# Patient Record
Sex: MALE | Race: BLACK OR AFRICAN AMERICAN | NOT HISPANIC OR LATINO | Employment: UNEMPLOYED | URBAN - METROPOLITAN AREA
[De-identification: names, ages, dates, MRNs, and addresses within clinical notes are randomized per-mention and may not be internally consistent; named-entity substitution may affect disease eponyms.]

---

## 2022-02-28 ENCOUNTER — OUTSIDE PLACE OF SERVICE (OUTPATIENT)
Dept: CARDIOLOGY | Facility: CLINIC | Age: 72
End: 2022-02-28
Payer: MEDICARE

## 2022-02-28 PROCEDURE — 93010 ELECTROCARDIOGRAM REPORT: CPT | Mod: ,,, | Performed by: INTERNAL MEDICINE

## 2022-02-28 PROCEDURE — 93010 PR ELECTROCARDIOGRAM REPORT: ICD-10-PCS | Mod: ,,, | Performed by: INTERNAL MEDICINE

## 2022-03-01 ENCOUNTER — OUTSIDE PLACE OF SERVICE (OUTPATIENT)
Dept: CARDIOLOGY | Facility: CLINIC | Age: 72
End: 2022-03-01
Payer: MEDICARE

## 2022-03-01 ENCOUNTER — TELEMEDICINE (OUTPATIENT)
Dept: CARDIOLOGY | Facility: HOSPITAL | Age: 72
End: 2022-03-01
Payer: MEDICARE

## 2022-03-01 PROCEDURE — 93010 ELECTROCARDIOGRAM REPORT: CPT | Mod: ,,, | Performed by: INTERNAL MEDICINE

## 2022-03-01 PROCEDURE — 93010 PR ELECTROCARDIOGRAM REPORT: ICD-10-PCS | Mod: ,,, | Performed by: INTERNAL MEDICINE

## 2022-03-01 PROCEDURE — 99220 PR INITIAL OBSERVATION CARE,LEVL III: CPT | Mod: GT,,, | Performed by: INTERNAL MEDICINE

## 2022-03-01 PROCEDURE — 99220 PR INITIAL OBSERVATION CARE,LEVL III: ICD-10-PCS | Mod: GT,,, | Performed by: INTERNAL MEDICINE

## 2022-03-01 NOTE — TELEMEDICINE CONSULT
Tele-Consult from Cardiology  [facility]  Cardiology  Teleconsultation Note      This consultation is from Jesse Dimas and was requested by        IP Unit    The patient arrived at the ED at:       )    Spoke nurse at bedside with patient assisting consultant. Also present with the patient at the time of the consultation:Nurse     Consults  Subjective:     History of Present Illness:    Dictation #1  MRN:[id number]  CSN:[id number]  Tele medicine consult from [facility].  71 years old male was admitted for syncope.  Unclear duration of syncope. It happed while sitting. No reported seizure.  By the time EMS arrived he was already awake.  Admitted to [facility].  Troponin noted to be slightly elevated 81--67--76--63. .  EKG interpretation he had occasional PVCs.  Also it was reported that he did have 10 beats run of nonsustained ventricular tachycardia at rate of 150 beats per minute.  Patient was given a bolus of IV fluids yesterday that resulted in respiratory distress and was given Lasix with improvement.  Chest x-ray was reported as clear.  EKG SR, frequent PVCs, prolonged QT, poor R wave progression   Patient does not take any medications.  And he does not follow-up with a physician.  It was reported that orthostatics were checked and was normal.  CPK was elevated 1900.  Current blood pressure 140/85  Sodium 141, potassium 3.5, BUN 35, serum creatinine 1.54, magnesium 1.7, hemoglobin 10, platelets 180  Review of Systems   Constitutional: Negative for chills and fever.   HENT: Negative.    Respiratory:        As in HPI    Cardiovascular:        As in HPI    Gastrointestinal:        Abd pain , frequent Bms.    Skin: Negative.    Neurological:        As in HPI    Psychiatric/Behavioral: Negative.      Physical Exam  Constitutional:       Appearance: Normal appearance.   HENT:      Head: Normocephalic and atraumatic.   Cardiovascular:      Rate and Rhythm: Rhythm irregular.   Pulmonary:       Comments: Lungs clear per nursing staff   Abdominal:      General: Abdomen is flat.      Palpations: Abdomen is soft.   Musculoskeletal:      Right lower leg: No edema.      Left lower leg: No edema.   Neurological:      General: No focal deficit present.      Mental Status: He is alert.   Psychiatric:         Mood and Affect: Mood normal.         Behavior: Behavior normal.       No notes on file    Subjective & objective note cannot be loaded without a specified hospital service.    Assessment/Plan:     Impression: Patient is a 71 y.o. male with:    71 year old male with     1. Syncope  2. NSVT  3. PVCs   4. Rhabdo   5. ASUNCION     Plan:  - Check Echo to assess EF and WMA  - Will start BB for the NSVT. Start Toprol 25 mg daily  - Will need 30 days EM  - Please keep mg > 2 and k > 4          Assessment & plan notes cannot be loaded without a specified hospital service.                Consultation ended: Teleconsult Time Documentation    Consulting clinician was informed of the encounter and consult note.    Jesse Dimas MD  Cardiology  [facility]

## 2022-03-02 ENCOUNTER — CLINICAL SUPPORT (OUTPATIENT)
Dept: CARDIOLOGY | Facility: CLINIC | Age: 72
End: 2022-03-02
Attending: INTERNAL MEDICINE
Payer: MEDICARE

## 2022-03-02 ENCOUNTER — OUTSIDE PLACE OF SERVICE (OUTPATIENT)
Dept: CARDIOLOGY | Facility: CLINIC | Age: 72
End: 2022-03-02

## 2022-03-02 VITALS — WEIGHT: 150 LBS | BODY MASS INDEX: 19.88 KG/M2 | HEIGHT: 73 IN

## 2022-03-02 DIAGNOSIS — R55 SYNCOPE, UNSPECIFIED SYNCOPE TYPE: ICD-10-CM

## 2022-03-02 LAB
AV PEAK GRADIENT: 13 MMHG
AV VELOCITY RATIO: 0.5
BSA FOR ECHO PROCEDURE: 1.87 M2
CV ECHO LV RWT: 0.03 CM
DOP CALC AO PEAK VEL: 1.8 M/S
DOP CALC LVOT AREA: 3.8 CM2
DOP CALC LVOT DIAMETER: 2.2 CM
DOP CALC LVOT PEAK VEL: 0.9 M/S
ECHO LV POSTERIOR WALL: 0.07 CM (ref 0.6–1.1)
EJECTION FRACTION: 55 %
FRACTIONAL SHORTENING: 22 % (ref 28–44)
INTERVENTRICULAR SEPTUM: 1 CM (ref 0.6–1.1)
LA MAJOR: 5.4 CM
LA MINOR: 6.3 CM
LA WIDTH: 4.7 CM
LEFT ATRIUM SIZE: 3.8 CM
LEFT ATRIUM VOLUME INDEX: 46.5 ML/M2
LEFT ATRIUM VOLUME: 88.28 CM3
LEFT INTERNAL DIMENSION IN SYSTOLE: 4 CM (ref 2.1–4)
LEFT VENTRICLE MASS INDEX: 45 G/M2
LEFT VENTRICULAR INTERNAL DIMENSION IN DIASTOLE: 5.1 CM (ref 3.5–6)
LEFT VENTRICULAR MASS: 85.66 G
PISA TR MAX VEL: 3.55 M/S
PV PEAK VELOCITY: 1.21 CM/S
RA MAJOR: 5.2 CM
RA PRESSURE: 8 MMHG
TDI LATERAL: 0.07 M/S
TDI SEPTAL: 0.04 M/S
TDI: 0.06 M/S
TR MAX PG: 50 MMHG
TV REST PULMONARY ARTERY PRESSURE: 58 MMHG

## 2022-03-02 PROCEDURE — 93306 ECHO (CUPID ONLY): ICD-10-PCS | Mod: 26,,, | Performed by: INTERNAL MEDICINE

## 2022-03-02 PROCEDURE — 93306 TTE W/DOPPLER COMPLETE: CPT | Mod: 26,,, | Performed by: INTERNAL MEDICINE

## 2022-03-11 NOTE — PROGRESS NOTES
Subjective:   @Patient ID:  Jonh Smith is a 71 y.o. male who presents for evaluation of Syncope     HPI:   Here for follow up post hospital discharge   He is accompanied by his sister  He stated that he is doing well. No chest pain, no palpitations  No recurrent syncope  He uses cane for ambulation due to his knees.   No tobacco abuse      Admitted to to [facility] 3/2022 with syncope. Unclear duration, no reported seizures. When EMS arrived he was already awake.  Troponin noted to be slightly elevated 81--67--76--63. EKG had occasional PVCs.  Also it was reported that he did have 10 beats NSVT on the monitor rate was 150 bpm. Toprol was started. Echo as below. 30 days EM recommended. Per his sister's description. She was told symptoms lasted for 3-5 minutes, his eyes rolled back helton. No injuries happened. It happened while he was sitting       Prior cardiovascular  Hx  --------------------------------         - ECHO 3/2/2022  · The left ventricle is normal in size with normal systolic function.  · The estimated ejection fraction is 55%.  · Mild-to-moderate mitral regurgitation.  · Normal left ventricular diastolic function.  · Mild aortic regurgitation.  · Mild to moderate tricuspid regurgitation.  · Mild pulmonic regurgitation.  · The estimated PA systolic pressure is 58 mmHg.  · Normal right ventricular size with normal right ventricular systolic function.  · There is moderate pulmonary hypertension.        - EKG 2/28/2022 SR, PACs, PVCs, poor r wave progression           Patient Active Problem List    Diagnosis Date Noted    NSVT (nonsustained ventricular tachycardia) 03/14/2022    Syncope and collapse 03/14/2022    Pulmonary HTN 03/14/2022    Nonrheumatic mitral valve regurgitation 03/14/2022    Nonrheumatic tricuspid valve regurgitation 03/14/2022                    LAST HbA1c  No results found for: HGBA1C    Lipid panel  No results found for: CHOL  No results found for: HDL  No results found for:  LDLCALC  No results found for: TRIG  No results found for: CHOLHDL         Review of Systems   Constitutional: Negative for chills and fever.   HENT: Negative for hearing loss and nosebleeds.    Eyes: Negative for blurred vision.   Cardiovascular: Negative for chest pain, leg swelling and palpitations.   Respiratory: Negative for hemoptysis and shortness of breath.    Hematologic/Lymphatic: Negative for bleeding problem.   Skin: Negative for itching.   Musculoskeletal: Positive for arthritis.   Gastrointestinal: Negative for abdominal pain and hematochezia.   Genitourinary: Negative for hematuria.   Neurological:        As in HPI    Psychiatric/Behavioral: Negative for altered mental status and depression.       Objective:   Physical Exam  Constitutional:       Appearance: He is well-developed.   HENT:      Head: Normocephalic and atraumatic.   Eyes:      Conjunctiva/sclera: Conjunctivae normal.   Neck:      Vascular: No carotid bruit or JVD.   Cardiovascular:      Rate and Rhythm: Normal rate and regular rhythm.      Pulses: Decreased pulses.           Carotid pulses are 2+ on the right side and 2+ on the left side.       Radial pulses are 2+ on the right side and 2+ on the left side.      Heart sounds: Normal heart sounds. No murmur heard.    No friction rub. No gallop.   Pulmonary:      Effort: Pulmonary effort is normal. No respiratory distress.      Breath sounds: Normal breath sounds. No stridor. No wheezing.   Musculoskeletal:      Cervical back: Neck supple.   Skin:     General: Skin is warm and dry.   Neurological:      Mental Status: He is alert and oriented to person, place, and time.   Psychiatric:         Behavior: Behavior normal.         Assessment:     1. Syncope and collapse    2. NSVT (nonsustained ventricular tachycardia)    3. Other forms of angina pectoris    4. Pulmonary HTN    5. Nonrheumatic mitral valve regurgitation    6. Nonrheumatic tricuspid valve regurgitation        Plan:     Unclear  etiology for his syncope. ?? Arrhythmias related in the setting of NSVT noted while inpatient. Will arranged to 30 days EM   Increase Toprol to 25 mg daily   Stress MPI   Monitor for th MR, TR  PHTN noted. May consider RHC in the future      Pertinent cardiac images and EKG reviewed independently.    Continue with current medical plan and lifestyle changes.  Return sooner for concerns or questions. If symptoms persist go to the ED  I have reviewed all pertinent data including patient's medical history in detail and updated the computerized patient record.     Orders Placed This Encounter   Procedures    NM Myocardial Perfusion Spect Multi Pharmacologic     Standing Status:   Future     Standing Expiration Date:   3/14/2023     Order Specific Question:   May the Radiologist modify the order per protocol to meet the clinical needs of the patient?     Answer:   Yes     Order Specific Question:   Stress Medication to use:     Answer:   Regadenoson     Order Specific Question:   Diabetes?     Answer:   No     Order Specific Question:   Will a Cardiologist read this study?     Answer:   No    Cardiac event monitor     Standing Status:   Future     Standing Expiration Date:   3/14/2023     Order Specific Question:   Cardiac Event Monitor     Answer:   Auto Trigger     Order Specific Question:   Release to patient     Answer:   Immediate    Nuclear Stress Test     Standing Status:   Future     Standing Expiration Date:   3/14/2023     Order Specific Question:   Which stress agent will be used     Answer:   Pharm     Order Specific Question:   Which medicaton for the stress procedure?     Answer:   Regadenoson     Order Specific Question:   Release to patient     Answer:   Immediate       Follow up as scheduled.     He expressed verbal understanding and agreed with the plan    Patient's Medications   New Prescriptions    No medications on file   Previous Medications    FOLIC ACID (FOLVITE) 1 MG TABLET    Take 1,000 mcg by  mouth once daily.    MULTIVIT-MINERALS/FOLIC ACID (CENTRUM ADULT 50 FRESH-FRUITY ORAL)    Take by mouth.    TAMSULOSIN (FLOMAX) 0.4 MG CAP    Take 1 capsule by mouth once daily.   Modified Medications    Modified Medication Previous Medication    METOPROLOL SUCCINATE (TOPROL-XL) 25 MG 24 HR TABLET metoprolol succinate (TOPROL-XL) 25 MG 24 hr tablet       Take 1 tablet (25 mg total) by mouth once daily.    Take 25 mg by mouth once daily.   Discontinued Medications    CEFDINIR (OMNICEF) 300 MG CAPSULE    Take 300 mg by mouth every 12 (twelve) hours.    COLCHICINE 0.6 MG TABLET    Take 1 tablet (0.6 mg total) by mouth once daily. Take 2 tabs initially then 1 tab one hour later.  Then take once daily on day 2.    ETODOLAC (LODINE) 300 MG CAP    Take 300 mg by mouth 2 (two) times daily.    HYDROCODONE-ACETAMINOPHEN 5-325MG (NORCO) 5-325 MG PER TABLET    Take 1 tablet by mouth every 4 (four) hours as needed for Pain.    HYDROCODONE-ACETAMINOPHEN 7.5-325MG (NORCO) 7.5-325 MG PER TABLET    Take 1 tablet by mouth every 6 (six) hours as needed for Pain.    POTASSIUM CHLORIDE SA (K-DUR,KLOR-CON) 20 MEQ TABLET    Take 20 mEq by mouth once daily.

## 2022-03-14 ENCOUNTER — OFFICE VISIT (OUTPATIENT)
Dept: CARDIOLOGY | Facility: CLINIC | Age: 72
End: 2022-03-14
Payer: MEDICARE

## 2022-03-14 VITALS
DIASTOLIC BLOOD PRESSURE: 69 MMHG | WEIGHT: 200.88 LBS | HEIGHT: 73 IN | HEART RATE: 86 BPM | SYSTOLIC BLOOD PRESSURE: 130 MMHG | OXYGEN SATURATION: 98 % | BODY MASS INDEX: 26.62 KG/M2

## 2022-03-14 DIAGNOSIS — I34.0 NONRHEUMATIC MITRAL VALVE REGURGITATION: Chronic | ICD-10-CM

## 2022-03-14 DIAGNOSIS — I20.89 OTHER FORMS OF ANGINA PECTORIS: ICD-10-CM

## 2022-03-14 DIAGNOSIS — I36.1 NONRHEUMATIC TRICUSPID VALVE REGURGITATION: Chronic | ICD-10-CM

## 2022-03-14 DIAGNOSIS — I47.29 NSVT (NONSUSTAINED VENTRICULAR TACHYCARDIA): ICD-10-CM

## 2022-03-14 DIAGNOSIS — R55 SYNCOPE AND COLLAPSE: Primary | ICD-10-CM

## 2022-03-14 DIAGNOSIS — I27.20 PULMONARY HTN: Chronic | ICD-10-CM

## 2022-03-14 PROCEDURE — 99214 OFFICE O/P EST MOD 30 MIN: CPT | Mod: S$GLB,,, | Performed by: INTERNAL MEDICINE

## 2022-03-14 PROCEDURE — 99214 PR OFFICE/OUTPT VISIT, EST, LEVL IV, 30-39 MIN: ICD-10-PCS | Mod: S$GLB,,, | Performed by: INTERNAL MEDICINE

## 2022-03-14 RX ORDER — POTASSIUM CHLORIDE 20 MEQ/1
20 TABLET, EXTENDED RELEASE ORAL DAILY
COMMUNITY
Start: 2022-03-03 | End: 2022-03-14

## 2022-03-14 RX ORDER — TAMSULOSIN HYDROCHLORIDE 0.4 MG/1
1 CAPSULE ORAL DAILY
COMMUNITY
Start: 2022-03-03

## 2022-03-14 RX ORDER — METOPROLOL SUCCINATE 25 MG/1
25 TABLET, EXTENDED RELEASE ORAL DAILY
COMMUNITY
Start: 2022-03-03 | End: 2022-03-14 | Stop reason: SDUPTHER

## 2022-03-14 RX ORDER — CEFDINIR 300 MG/1
300 CAPSULE ORAL EVERY 12 HOURS
COMMUNITY
Start: 2022-03-03 | End: 2022-03-14

## 2022-03-14 RX ORDER — FOLIC ACID 1 MG/1
1000 TABLET ORAL DAILY
COMMUNITY
Start: 2022-03-03

## 2022-03-14 RX ORDER — METOPROLOL SUCCINATE 25 MG/1
25 TABLET, EXTENDED RELEASE ORAL DAILY
Qty: 30 TABLET | Refills: 11 | Status: SHIPPED | OUTPATIENT
Start: 2022-03-14 | End: 2022-05-11 | Stop reason: SDUPTHER

## 2022-03-15 ENCOUNTER — CLINICAL SUPPORT (OUTPATIENT)
Dept: CARDIOLOGY | Facility: HOSPITAL | Age: 72
End: 2022-03-15
Attending: INTERNAL MEDICINE
Payer: MEDICARE

## 2022-03-15 DIAGNOSIS — I47.29 NSVT (NONSUSTAINED VENTRICULAR TACHYCARDIA): ICD-10-CM

## 2022-03-15 DIAGNOSIS — R55 SYNCOPE AND COLLAPSE: ICD-10-CM

## 2022-03-25 ENCOUNTER — HOSPITAL ENCOUNTER (OUTPATIENT)
Dept: RADIOLOGY | Facility: HOSPITAL | Age: 72
Discharge: HOME | End: 2022-03-25
Attending: INTERNAL MEDICINE
Payer: MEDICARE

## 2022-03-25 ENCOUNTER — HOSPITAL ENCOUNTER (OUTPATIENT)
Dept: CARDIOLOGY | Facility: HOSPITAL | Age: 72
Discharge: HOME | End: 2022-03-25
Attending: INTERNAL MEDICINE
Payer: MEDICARE

## 2022-03-25 DIAGNOSIS — R55 SYNCOPE AND COLLAPSE: ICD-10-CM

## 2022-03-25 DIAGNOSIS — I20.89 OTHER FORMS OF ANGINA PECTORIS: ICD-10-CM

## 2022-03-25 DIAGNOSIS — I47.29 NSVT (NONSUSTAINED VENTRICULAR TACHYCARDIA): ICD-10-CM

## 2022-03-25 LAB
CV PHARM DOSE: 0 MG
CV STRESS BASE HR: 77 BPM
DIASTOLIC BLOOD PRESSURE: 115 MMHG
OHS CV CPX 1 MINUTE RECOVERY HEART RATE: 88 BPM
OHS CV CPX 85 PERCENT MAX PREDICTED HEART RATE MALE: 128
OHS CV CPX MAX PREDICTED HEART RATE: 150
OHS CV CPX PATIENT IS FEMALE: 0
OHS CV CPX PATIENT IS MALE: 1
OHS CV CPX PEAK DIASTOLIC BLOOD PRESSURE: 115 MMHG
OHS CV CPX PEAK HEAR RATE: 90 BPM
OHS CV CPX PEAK RATE PRESSURE PRODUCT: NORMAL
OHS CV CPX PEAK SYSTOLIC BLOOD PRESSURE: 164 MMHG
OHS CV CPX PERCENT MAX PREDICTED HEART RATE ACHIEVED: 60
OHS CV CPX RATE PRESSURE PRODUCT PRESENTING: NORMAL
SYSTOLIC BLOOD PRESSURE: 164 MMHG

## 2022-03-25 PROCEDURE — 93017 CV STRESS TEST TRACING ONLY: CPT | Mod: PO

## 2022-03-25 PROCEDURE — 93016 CV STRESS TEST SUPVJ ONLY: CPT | Mod: ,,, | Performed by: INTERNAL MEDICINE

## 2022-03-25 PROCEDURE — 63600175 PHARM REV CODE 636 W HCPCS: Mod: PO | Performed by: INTERNAL MEDICINE

## 2022-03-25 PROCEDURE — 93018 CV STRESS TEST I&R ONLY: CPT | Mod: ,,, | Performed by: INTERNAL MEDICINE

## 2022-03-25 PROCEDURE — 93018 NUCLEAR STRESS TEST (CUPID ONLY): ICD-10-PCS | Mod: ,,, | Performed by: INTERNAL MEDICINE

## 2022-03-25 PROCEDURE — 93016 NUCLEAR STRESS TEST (CUPID ONLY): ICD-10-PCS | Mod: ,,, | Performed by: INTERNAL MEDICINE

## 2022-03-25 PROCEDURE — A9502 TC99M TETROFOSMIN: HCPCS | Mod: PO

## 2022-03-25 RX ORDER — REGADENOSON 0.08 MG/ML
0.4 INJECTION, SOLUTION INTRAVENOUS ONCE
Status: COMPLETED | OUTPATIENT
Start: 2022-03-25 | End: 2022-03-25

## 2022-03-25 RX ADMIN — REGADENOSON 0.4 MG: 0.08 INJECTION, SOLUTION INTRAVENOUS at 01:03

## 2022-04-28 NOTE — PROGRESS NOTES
Please let him know the monitor result is ok. It didn't show any significant arrhythmias. Just occasional extra beats. Thanks

## 2022-05-09 NOTE — PROGRESS NOTES
Subjective:   @Patient ID:  Jonh Smith is a 72 y.o. male who presents for evaluation of Syncope     HPI:   Here for follow up   He is accompanied by his sister  He stated that he is doing well. No chest pain, no palpitations  No recurrent syncope  He uses cane for ambulation due to his knees.   Stress MPI with no ischemia   30 day EM with no arrhythmias     No tobacco abuse      Admitted to to [facility] 3/2022 with syncope. Unclear duration, no reported seizures. When EMS arrived he was already awake.  Troponin noted to be slightly elevated 81--67--76--63. EKG had occasional PVCs.  Also it was reported that he did have 10 beats NSVT on the monitor rate was 150 bpm. Toprol was started. Echo as below. 30 days EM recommended. Per his sister's description. She was told symptoms lasted for 3-5 minutes, his eyes rolled back helton. No injuries happened. It happened while he was sitting       Prior cardiovascular  Hx  --------------------------------    Stress MPI 3/14/2022  · Baseline rhythm was normal sinus rhythm with normal intervals, occasional PACs and PVCs, and an initial heart rate of 78 bpm.  · There was one patient-triggered episode with reported symptoms of dizziness. This episode corresponded to a period of normal sinus rhythm with occasional PACs and PVCs.  · There were no concerning atrial or ventricular arrhythmias.      30 days EM 4/2022  · Baseline rhythm was normal sinus rhythm with normal intervals, occasional PACs and PVCs, and an initial heart rate of 78 bpm.  · There was one patient-triggered episode with reported symptoms of dizziness. This episode corresponded to a period of normal sinus rhythm with occasional PACs and PVCs.  · There were no concerning atrial or ventricular arrhythmias.           - ECHO 3/2/2022  · The left ventricle is normal in size with normal systolic function.  · The estimated ejection fraction is 55%.  · Mild-to-moderate mitral regurgitation.  · Normal left ventricular  diastolic function.  · Mild aortic regurgitation.  · Mild to moderate tricuspid regurgitation.  · Mild pulmonic regurgitation.  · The estimated PA systolic pressure is 58 mmHg.  · Normal right ventricular size with normal right ventricular systolic function.  · There is moderate pulmonary hypertension.        - EKG 2/28/2022 SR, PACs, PVCs, poor r wave progression           Patient Active Problem List    Diagnosis Date Noted    NSVT (nonsustained ventricular tachycardia) 03/14/2022    Syncope and collapse 03/14/2022    Pulmonary HTN 03/14/2022    Nonrheumatic mitral valve regurgitation 03/14/2022    Nonrheumatic tricuspid valve regurgitation 03/14/2022                    LAST HbA1c  No results found for: HGBA1C    Lipid panel  No results found for: CHOL  No results found for: HDL  No results found for: LDLCALC  No results found for: TRIG  No results found for: CHOLHDL         Review of Systems   Constitutional: Negative for chills and fever.   HENT: Negative for hearing loss and nosebleeds.    Eyes: Negative for blurred vision.   Cardiovascular: Negative for chest pain, leg swelling and palpitations.   Respiratory: Negative for hemoptysis and shortness of breath.    Hematologic/Lymphatic: Negative for bleeding problem.   Skin: Negative for itching.   Musculoskeletal: Positive for arthritis.   Gastrointestinal: Negative for abdominal pain and hematochezia.   Genitourinary: Negative for hematuria.   Neurological:        As in HPI    Psychiatric/Behavioral: Negative for altered mental status and depression.       Objective:   Physical Exam  Constitutional:       Appearance: He is well-developed.   HENT:      Head: Normocephalic and atraumatic.   Eyes:      Conjunctiva/sclera: Conjunctivae normal.   Neck:      Vascular: No carotid bruit or JVD.   Cardiovascular:      Rate and Rhythm: Normal rate and regular rhythm.      Pulses: Decreased pulses.           Carotid pulses are 2+ on the right side and 2+ on the left  side.       Radial pulses are 2+ on the right side and 2+ on the left side.      Heart sounds: Normal heart sounds. No murmur heard.    No friction rub. No gallop.   Pulmonary:      Effort: Pulmonary effort is normal. No respiratory distress.      Breath sounds: Normal breath sounds. No stridor. No wheezing.   Musculoskeletal:      Cervical back: Neck supple.   Skin:     General: Skin is warm and dry.   Neurological:      Mental Status: He is alert and oriented to person, place, and time.   Psychiatric:         Behavior: Behavior normal.         Assessment:     1. Pulmonary HTN    2. NSVT (nonsustained ventricular tachycardia)    3. Nonrheumatic mitral valve regurgitation    4. Nonrheumatic tricuspid valve regurgitation        Plan:   - 30 day Em with no arrhythmias. If recurrent syncope then will recommend ILR   - Repeat BP is 120s/60s b/l  - Continue Toprol to 25 mg daily   - Monitor for th MR, TR. Repeat echo after 1-2 years     PHTN noted. May consider RHC in the future     I spent 5-10 minutes asking, assessing, assisting, arranging and advising heart healthy diet improvements. This included low-salt meals, portion control and health food alternatives. I also encourage 30 minutes of moderate exercise 3-4x a week.     Pertinent cardiac images and EKG reviewed independently.    Continue with current medical plan and lifestyle changes.  Return sooner for concerns or questions. If symptoms persist go to the ED  I have reviewed all pertinent data including patient's medical history in detail and updated the computerized patient record.     No orders of the defined types were placed in this encounter.      Follow up as scheduled.     He expressed verbal understanding and agreed with the plan    Patient's Medications   New Prescriptions    No medications on file   Previous Medications    FOLIC ACID (FOLVITE) 1 MG TABLET    Take 1,000 mcg by mouth once daily.    MULTIVIT-MINERALS/FOLIC ACID (CENTRUM ADULT 50  FRESH-FRUITY ORAL)    Take by mouth.    TAMSULOSIN (FLOMAX) 0.4 MG CAP    Take 1 capsule by mouth once daily.   Modified Medications    Modified Medication Previous Medication    METOPROLOL SUCCINATE (TOPROL-XL) 25 MG 24 HR TABLET metoprolol succinate (TOPROL-XL) 25 MG 24 hr tablet       Take 1 tablet (25 mg total) by mouth once daily.    Take 1 tablet (25 mg total) by mouth once daily.   Discontinued Medications    No medications on file

## 2022-05-11 ENCOUNTER — OFFICE VISIT (OUTPATIENT)
Dept: CARDIOLOGY | Facility: CLINIC | Age: 72
End: 2022-05-11
Payer: MEDICARE

## 2022-05-11 VITALS
HEIGHT: 73 IN | WEIGHT: 192.63 LBS | BODY MASS INDEX: 25.53 KG/M2 | SYSTOLIC BLOOD PRESSURE: 127 MMHG | HEART RATE: 65 BPM | DIASTOLIC BLOOD PRESSURE: 65 MMHG | OXYGEN SATURATION: 96 %

## 2022-05-11 DIAGNOSIS — I27.20 PULMONARY HTN: Primary | Chronic | ICD-10-CM

## 2022-05-11 DIAGNOSIS — I47.29 NSVT (NONSUSTAINED VENTRICULAR TACHYCARDIA): ICD-10-CM

## 2022-05-11 DIAGNOSIS — I36.1 NONRHEUMATIC TRICUSPID VALVE REGURGITATION: Chronic | ICD-10-CM

## 2022-05-11 DIAGNOSIS — I34.0 NONRHEUMATIC MITRAL VALVE REGURGITATION: Chronic | ICD-10-CM

## 2022-05-11 PROCEDURE — 99214 OFFICE O/P EST MOD 30 MIN: CPT | Mod: S$GLB,,, | Performed by: INTERNAL MEDICINE

## 2022-05-11 PROCEDURE — 99214 PR OFFICE/OUTPT VISIT, EST, LEVL IV, 30-39 MIN: ICD-10-PCS | Mod: S$GLB,,, | Performed by: INTERNAL MEDICINE

## 2022-05-11 RX ORDER — METOPROLOL SUCCINATE 25 MG/1
25 TABLET, EXTENDED RELEASE ORAL DAILY
Qty: 90 TABLET | Refills: 3 | Status: SHIPPED | OUTPATIENT
Start: 2022-05-11

## 2022-05-11 RX ORDER — METOPROLOL SUCCINATE 25 MG/1
25 TABLET, EXTENDED RELEASE ORAL DAILY
Qty: 30 TABLET | Refills: 11 | Status: SHIPPED | OUTPATIENT
Start: 2022-05-11 | End: 2022-05-11

## 2023-04-22 ENCOUNTER — OUTSIDE PLACE OF SERVICE (OUTPATIENT)
Dept: CARDIOLOGY | Facility: CLINIC | Age: 73
End: 2023-04-22
Payer: MEDICARE

## 2023-04-22 PROCEDURE — 93010 PR ELECTROCARDIOGRAM REPORT: ICD-10-PCS | Mod: ,,, | Performed by: INTERNAL MEDICINE

## 2023-04-22 PROCEDURE — 93010 ELECTROCARDIOGRAM REPORT: CPT | Mod: ,,, | Performed by: INTERNAL MEDICINE
